# Patient Record
Sex: FEMALE | Race: OTHER
[De-identification: names, ages, dates, MRNs, and addresses within clinical notes are randomized per-mention and may not be internally consistent; named-entity substitution may affect disease eponyms.]

---

## 2019-04-13 ENCOUNTER — HOSPITAL ENCOUNTER (EMERGENCY)
Dept: HOSPITAL 54 - ER | Age: 69
Discharge: HOME | End: 2019-04-13
Payer: MEDICARE

## 2019-04-13 VITALS — WEIGHT: 155 LBS | HEIGHT: 63 IN | BODY MASS INDEX: 27.46 KG/M2

## 2019-04-13 VITALS — DIASTOLIC BLOOD PRESSURE: 91 MMHG | SYSTOLIC BLOOD PRESSURE: 134 MMHG

## 2019-04-13 DIAGNOSIS — J32.9: Primary | ICD-10-CM

## 2019-04-13 NOTE — NUR
PT BIBSELF C/O COUGH/CONGESTION/SINUS X 3 DAYS. PT IS AAOX4, NOT IN RESPIRATORY 
DISTRESS, V/S STABLE, KEPT RESTED AND COMFORTABLE, WILL CONTINUE TO MONITOR.